# Patient Record
Sex: FEMALE | Race: WHITE | NOT HISPANIC OR LATINO | Employment: FULL TIME | URBAN - METROPOLITAN AREA
[De-identification: names, ages, dates, MRNs, and addresses within clinical notes are randomized per-mention and may not be internally consistent; named-entity substitution may affect disease eponyms.]

---

## 2024-09-19 ENCOUNTER — HOSPITAL ENCOUNTER (OUTPATIENT)
Dept: RADIOLOGY | Facility: CLINIC | Age: 44
Discharge: HOME | End: 2024-09-19
Payer: COMMERCIAL

## 2024-09-19 DIAGNOSIS — E78.5 HYPERLIPIDEMIA, UNSPECIFIED: ICD-10-CM

## 2024-09-19 DIAGNOSIS — Z00.00 ENCOUNTER FOR GENERAL ADULT MEDICAL EXAMINATION WITHOUT ABNORMAL FINDINGS: ICD-10-CM

## 2024-09-19 PROCEDURE — 75571 CT HRT W/O DYE W/CA TEST: CPT

## 2025-03-03 ENCOUNTER — APPOINTMENT (OUTPATIENT)
Dept: RHEUMATOLOGY | Facility: CLINIC | Age: 45
End: 2025-03-03
Payer: COMMERCIAL

## 2025-03-03 VITALS
DIASTOLIC BLOOD PRESSURE: 63 MMHG | SYSTOLIC BLOOD PRESSURE: 108 MMHG | WEIGHT: 157 LBS | TEMPERATURE: 97 F | BODY MASS INDEX: 26.8 KG/M2 | HEART RATE: 72 BPM | HEIGHT: 64 IN

## 2025-03-03 DIAGNOSIS — R76.8 SS-A ANTIBODY POSITIVE: ICD-10-CM

## 2025-03-03 DIAGNOSIS — R76.8 SS-B ANTIBODY POSITIVE: ICD-10-CM

## 2025-03-03 DIAGNOSIS — J98.4 LUNG CYST: ICD-10-CM

## 2025-03-03 DIAGNOSIS — R76.8 ANA POSITIVE: ICD-10-CM

## 2025-03-03 DIAGNOSIS — M35.00 SJOGREN'S SYNDROME, WITH UNSPECIFIED ORGAN INVOLVEMENT (MULTI): Primary | ICD-10-CM

## 2025-03-03 PROCEDURE — 99204 OFFICE O/P NEW MOD 45 MIN: CPT

## 2025-03-03 PROCEDURE — 3008F BODY MASS INDEX DOCD: CPT

## 2025-03-03 PROCEDURE — 1036F TOBACCO NON-USER: CPT

## 2025-03-03 RX ORDER — PILOCARPINE HYDROCHLORIDE 5 MG/1
5 TABLET, FILM COATED ORAL 3 TIMES DAILY
Qty: 90 TABLET | Refills: 2 | Status: SHIPPED | OUTPATIENT
Start: 2025-03-03 | End: 2025-06-01

## 2025-03-03 RX ORDER — THYROID 30 MG/1
30 TABLET ORAL
COMMUNITY
Start: 2024-12-10

## 2025-03-03 RX ORDER — METFORMIN HYDROCHLORIDE 1000 MG/1
1000 TABLET ORAL
COMMUNITY
Start: 2024-09-27

## 2025-03-03 ASSESSMENT — PAIN SCALES - GENERAL: PAINLEVEL_OUTOF10: 0-NO PAIN

## 2025-03-03 NOTE — PROGRESS NOTES
Rheumatology Note      Patient Bianca Hernandez  MRN [id number]     CC: Ms. Bianca Hernandez is a 44 y.o. female with history of PCOS, vitamin D deficiency, hypothyroidism, celiac,  history of asthma who presents to the clinic for evaluation of recently diagnosed Sjogrens.    Subjective    Subjective   History of Presenting Illness  Patient is presenting to rheumatology clinic today for evaluation of recently diagnosed. Patient reports having a lifetime of autoimmune symptoms - IBS, chronic fatigue. In the fall, patient had a routine heart calcium screening. She was found to have cysts on her lungs and went to the pulmonologist, who initiated autoimmune work up due to patient symptoms. Found to have positive 1:1280, +SSA/SSB and saw rheumatology. Prescribed hydroxychloroquine and pilocarpine however has not yet started. Shortly after time of diagnosis, patient felt bump in her armpit, ultrasound showing sebaceous cyst and lymph node biopsied suggestive of reactive/inflammatory process.     Of note, patient saw rheumatology via [facility] in 11/2024. Significant sicca symptoms with +SSA and SSB, CORNELIO 1:1280. RF positive. Discussed starting plaquenil and seeing ophthalmology. She was offered pilocarpine 5 mg TID. Needs annual SPEP. CAT scan of her chest was reviewed personally and it showed few scattered pneumatoceles in both lungs with no evidence of fibrosis or emphysema. At this point of time I will wait for PFT results and she does not seem to have significant shortness of breath or pulmonary fibrosis on the CAT scan so no need to start agents like CellCept at this point of time. Continue to follow with the pulmonary team.     ROS:  Constitutional: Denies fever, night sweats. Increased fatigue (has always been like this and busy lifestyle)  Head: Denies patchy hair loss. +wakes up with headaches, 3 mornings a week started more recently  Eyes: Denies redness of the eyes, H/O uveitis. Significant dry eyes, using eye  "drops twice a day and feels they are wearing off quicker. Pain in eyes from gritty feeling. Has appointment with eye doctor tomorrow   ENT: Denies sores in the mouth, nose bleed. +significant dry mouth and has been going on for a long time. Has major dental issues. +difficulty with swallowing especially things like meat and dry foods  Cardiovascular: Denies chest pain  Respiratory: Denies shortness of breath  Gastrointestinal: Denies nausea, vomiting , diarrhea or blood in the stool. Stomach always hurts a little bit  Genitourinary: No urinary urgency, frequency, dysuria   Integumentary: Denies photosensitivity, rash or lesions, or psoriasis. Finger tips are numb and turn white and blue for several minutes. Mild Raynauds symptoms when it's really cold  Neurological: Denies any numbness or tingling  Hematologic/Lymphatic: No blood clots  MSK: For a long time, feet were painful, usually towards end of the day and can't have them touching bed. Throbbing, pulsating pressure. Ankles and wrists are very fragile, knuckles might swell from time to time. Patient is very active and pain does not stop her from being active. Ankles will swell at night and wears compression socks at night.     Family history: sister with NOMID  Social history: No smoking, social alcohol use    No past medical history on file.     No Known Allergies     Objective   Objective     /63   Pulse 72   Temp 36.1 °C (97 °F)   Ht 1.626 m (5' 4\")   Wt 71.2 kg (157 lb)   BMI 26.95 kg/m²      PHYSICAL EXAM:  General - NAD, pleasant, AAOx3  Head: Normocephalic, atraumatic  Eyes - PERRLA, EOMI. No conjunctiva injection.   Mouth/ENT - Moist oral and nasal mucosa. No facial rash. Poor salivary pooling, gum recession, plaque buildup.  Skin - No rashes or ulcer  Extremities - No edema  Neurological - Alert and oriented x3,  grossly intact. No focal deficit    Musculoskeletal  Shoulders: Full ROM, without pain, no swelling, warmth or tenderness.  Elbows: " "Full ROM, without pain, no swelling, warmth or tenderness.  Wrists/Hands: Full ROM, without pain, no swelling, warmth or tenderness at wrists, MCPs, PIPs, or DIPs.  Hands : 5/5.    Hips: Full ROM.   Knees:  Full ROM, without pain, no swelling, warmth or point tenderness.   Ankles: Full ROM, without pain, swelling, warmth or tenderness.  Toes: No swelling, warmth or tenderness.   Cervical spine: No tenderness or limitation of movement  Lumbar spine: No tenderness or limitation of movement     LABS:  Lab Results   Component Value Date    WBC 4.9 06/03/2020    HGB 13.2 06/03/2020    HCT 40.9 06/03/2020    MCV 97 06/03/2020     06/03/2020      Lab Results   Component Value Date    GLUCOSE 95 06/03/2020    CO2 25 06/03/2020    BUN 16 06/03/2020    CALCIUM 10.3 06/03/2020    ALBUMIN 4.3 06/03/2020      No results found for: \"CRP\"  No results found for: \"SEDRATE\"  No results found for: \"C3\", \"C4\"  No results found for: \"RF\", \"CITAB\"  No results found for: \"ANATITER\", \"ARNP\", \"ASMRN\", \"ASSA\", \"ASSB\", \"ASCL\", \"JO1\", \"ACHR\", \"ACEN\", \"RIBPP\", \"DNADS\", \"ANCPA\", \"ANCTI\", \"PR3\", \"MPO\"  No results found for: \"PROTUR\", \"GLUCOSEU\", \"BLOODU\", \"KETONESU\", \"NITRITEU\", \"LEUKOCYTESU\"  No results found for: \"UTPCR\"     No results found for: \"URICACID\"  No results found for: \"COLORFL\", \"CLARITYFLUID\", \"WBCFL\", \"NEUTROBFREL\", \"LYMPHSBFREL\", \"EOSBFREL\", \"BASOBFREL\", \"PLASMACFLD\", \"RBCFL\", \"CRYSFL\"    Care Everywhere results 11/2024:  RF 20, anti-CCP negative  ESR 2, CRP <3  SSA/SSB >8  CORNELIO >/= 1:1280 in nuclear, speckled pattern. Negative anti-smooth muscle Ab, negative Sm/RNP Ab. Negative C3, C4  CBC, CMP, creatinine, UA normal  SPEP normal    IMAGING:  CT chest with contrast 10/21/2024:  Few scattered pneumatoceles in both lungs. No evidence of pulmonary fibrosis or emphysema.  Mild axillary lymphadenopathy, likely reactive in nature.    Biopsy of left axilla lymph node 1/7/2025:  A. LEFT AXILLA LYMPH NODE, BIOPSY -   LYMPHOID " TISSUE PRESENT WITH REACTIVE FOLLICULAR HYPERPLASIA..  NEGATIVE FOR CARCINOMA OR LYMPHOPROLIFERATIVE PROCESS WITHIN THE SUBMITTED SPECIMEN.     B.  LEFT AXILLA LYMPH NODE, RPMI SPECIMEN -   NO EVIDENCE OF A B-CELL LYMPHOPROLIFERATIVE PROCESS.  T CELLS WITH CD4 PREDOMINANCE SUGGESTIVE OF A REACTIVE/INFLAMMATORY PROCESS.  POLYCLONAL PLASMA CELLS, FAVOR REACTIVE/INFLAMMATORY PROCESS    Assessment    Assessment & Plan   Ms. Bianca Hernandez is a 44 y.o. female with history of PCOS, vitamin D deficiency, hypothyroidism, celiac,  history of asthma who presents to the clinic for evaluation of recently diagnosed Sjogrens.    Reviewed outside pulmonary and rheumatology clinic notes. Reviewed Care Everywhere results 11/2024 as follows:  - CORNELIO >/= 1:1280 in nuclear, speckled pattern. SSA/SSB >8. Negative anti-Sm Ab, negative Sm/RNP Ab. RF 20, anti-CCP negative. Negative C3, C4. ESR 2, CRP <3  - CBC, CMP, creatinine, UA normal  - SPEP normal  - CT chest 10/2024 with few scattered pneumatoceles in both lungs. PFT preserved function    #Sjogrens (+CORNELIO, SSA, SSB, RF)  #Sicca symptoms  #Lymphadenopathy, reactive on biopsy  #Cystic lung disease  - Patient counseled on on information including but not limited to diagnosis, manifestations, prognosis, recommended treatment   - Significant sicca symptoms  - For ocular dryness, using artificial tears and has appointment with ophthalmology tomorrow for further evaluation  - For oral dryness, has significant dental disease thus would benefit from pilocarpine. Start 5 mg daily x1 week, followed by 5 mg BID x1 week and can increase to TID if needed and tolerating. Starting slowly given history of asthma to ensure tolerating (no longer requires inhalers)  - Cystic lung disease (few pneumatoceles), unclear relation to Sjogrens. Imaging without ILD and preserved PFTs. No indication to start immunosuppression at this time. Continue follow up with pulmonary for monitoring  - Joint pain description  less likely inflammatory, no evidence of synovitis on exam today. Can hold off on HCQ   - Yearly SPEP, RF, C3, C4    RTC in 4 months    Case seen and discussed with Dr. Frank  Signature: Delmi Knight,   Date: March 3, 2025

## 2025-03-03 NOTE — PATIENT INSTRUCTIONS
For pilocarpine:  - Start with 1 tablet (5 mg) once a day for week  - If tolerating it well, can increase to 1 tablet (5 mg) twice a day for a week  - Can go up to three times a day thereafter if needed    Follow up in 4 months, if anything comes up earlier you can schedule an earlier appointment

## 2025-03-04 NOTE — PROGRESS NOTES
I saw and evaluated the patient. I personally obtained the key and critical portions of the history and physical exam or was physically present for key and critical portions performed by the resident/fellow. I reviewed the resident/fellow's documentation and discussed the patient with the resident/fellow. I agree with the resident/fellow's medical decision making as documented in the note with the exception/addition of the following:    Sicca symptoms with +ve SSA/SSB  Symptomatic management  No systemic features of pSS at the moment, mildly positive RF with normal complements  Has a few cysts vs pneumatoceles in the lung but no element of ILD on report - so do not think this is LIP or related to Sjogrens at this time  No images in the system    Will obtain CT chest at  if reports any resp symptoms+ pulm referral  No indication for immunosuppression at present    Rest as documented by Dr Eugene Frank MD FACR   of Medicine  [facility] - Department of Rheumatology  [facility]